# Patient Record
Sex: FEMALE | Race: BLACK OR AFRICAN AMERICAN | NOT HISPANIC OR LATINO | Employment: UNEMPLOYED | URBAN - METROPOLITAN AREA
[De-identification: names, ages, dates, MRNs, and addresses within clinical notes are randomized per-mention and may not be internally consistent; named-entity substitution may affect disease eponyms.]

---

## 2023-08-31 ENCOUNTER — OFFICE VISIT (OUTPATIENT)
Dept: OBSTETRICS AND GYNECOLOGY | Facility: CLINIC | Age: 33
End: 2023-08-31
Payer: COMMERCIAL

## 2023-08-31 ENCOUNTER — TELEPHONE (OUTPATIENT)
Dept: OBSTETRICS AND GYNECOLOGY | Facility: CLINIC | Age: 33
End: 2023-08-31

## 2023-08-31 VITALS
DIASTOLIC BLOOD PRESSURE: 68 MMHG | BODY MASS INDEX: 32.47 KG/M2 | SYSTOLIC BLOOD PRESSURE: 115 MMHG | HEIGHT: 61 IN | WEIGHT: 172 LBS

## 2023-08-31 DIAGNOSIS — Z32.01 POSITIVE PREGNANCY TEST: Primary | ICD-10-CM

## 2023-08-31 LAB
B-HCG UR QL: POSITIVE
CTP QC/QA: YES

## 2023-08-31 PROCEDURE — 3008F PR BODY MASS INDEX (BMI) DOCUMENTED: ICD-10-PCS | Mod: CPTII,S$GLB,, | Performed by: STUDENT IN AN ORGANIZED HEALTH CARE EDUCATION/TRAINING PROGRAM

## 2023-08-31 PROCEDURE — 3074F SYST BP LT 130 MM HG: CPT | Mod: CPTII,S$GLB,, | Performed by: STUDENT IN AN ORGANIZED HEALTH CARE EDUCATION/TRAINING PROGRAM

## 2023-08-31 PROCEDURE — 99213 OFFICE O/P EST LOW 20 MIN: CPT | Mod: S$GLB,,, | Performed by: STUDENT IN AN ORGANIZED HEALTH CARE EDUCATION/TRAINING PROGRAM

## 2023-08-31 PROCEDURE — 99213 PR OFFICE/OUTPT VISIT, EST, LEVL III, 20-29 MIN: ICD-10-PCS | Mod: S$GLB,,, | Performed by: STUDENT IN AN ORGANIZED HEALTH CARE EDUCATION/TRAINING PROGRAM

## 2023-08-31 PROCEDURE — 3074F PR MOST RECENT SYSTOLIC BLOOD PRESSURE < 130 MM HG: ICD-10-PCS | Mod: CPTII,S$GLB,, | Performed by: STUDENT IN AN ORGANIZED HEALTH CARE EDUCATION/TRAINING PROGRAM

## 2023-08-31 PROCEDURE — 81025 POCT URINE PREGNANCY: ICD-10-PCS | Mod: S$GLB,,, | Performed by: STUDENT IN AN ORGANIZED HEALTH CARE EDUCATION/TRAINING PROGRAM

## 2023-08-31 PROCEDURE — 81025 URINE PREGNANCY TEST: CPT | Mod: S$GLB,,, | Performed by: STUDENT IN AN ORGANIZED HEALTH CARE EDUCATION/TRAINING PROGRAM

## 2023-08-31 PROCEDURE — 3078F PR MOST RECENT DIASTOLIC BLOOD PRESSURE < 80 MM HG: ICD-10-PCS | Mod: CPTII,S$GLB,, | Performed by: STUDENT IN AN ORGANIZED HEALTH CARE EDUCATION/TRAINING PROGRAM

## 2023-08-31 PROCEDURE — 3078F DIAST BP <80 MM HG: CPT | Mod: CPTII,S$GLB,, | Performed by: STUDENT IN AN ORGANIZED HEALTH CARE EDUCATION/TRAINING PROGRAM

## 2023-08-31 PROCEDURE — 3008F BODY MASS INDEX DOCD: CPT | Mod: CPTII,S$GLB,, | Performed by: STUDENT IN AN ORGANIZED HEALTH CARE EDUCATION/TRAINING PROGRAM

## 2023-08-31 PROCEDURE — 1159F MED LIST DOCD IN RCRD: CPT | Mod: CPTII,S$GLB,, | Performed by: STUDENT IN AN ORGANIZED HEALTH CARE EDUCATION/TRAINING PROGRAM

## 2023-08-31 PROCEDURE — 1159F PR MEDICATION LIST DOCUMENTED IN MEDICAL RECORD: ICD-10-PCS | Mod: CPTII,S$GLB,, | Performed by: STUDENT IN AN ORGANIZED HEALTH CARE EDUCATION/TRAINING PROGRAM

## 2023-08-31 NOTE — PROGRESS NOTES
"  CC: Absence of menses    Rachel Garza is a 32 y.o. female  presents with complaint of absence of menstruation. She denies nausea/vomIting/abdominal pain/bleeding. UPT is positive. Patient's last menstrual period was 06/10/2023 (exact date). known.  Periods are regular periods every month.    History reviewed. No pertinent past medical history.  Past Surgical History:   Procedure Laterality Date     SECTION, LOW TRANSVERSE N/A     GASTRIC BYPASS       Social History     Socioeconomic History    Marital status: Single   Tobacco Use    Smoking status: Never     Passive exposure: Never    Smokeless tobacco: Never   Substance and Sexual Activity    Alcohol use: Not Currently    Drug use: Never    Sexual activity: Yes     Partners: Male     Birth control/protection: None     Family History   Family history unknown: Yes     OB History    Para Term  AB Living   2 1 1     1   SAB IAB Ectopic Multiple Live Births           1      # Outcome Date GA Lbr Shalom/2nd Weight Sex Delivery Anes PTL Lv   2 Current            1 Term 06/15/17 38w0d  2.551 kg (5 lb 10 oz) F INDUCTION EPI  BRIDGETTE       /68 (BP Location: Left arm, Patient Position: Sitting)   Ht 5' 1" (1.549 m)   Wt 78 kg (172 lb)   LMP 06/10/2023 (Exact Date)   BMI 32.50 kg/m²     ROS:  GENERAL: Denies weight gain or weight loss. Feeling well overall.   SKIN: Denies rash or lesions.   HEAD: Denies head injury or headache.   CHEST: Denies chest pain or shortness of breath.   CARDIOVASCULAR: Denies palpitations or left sided chest pain.   ABDOMEN: No abdominal pain, constipation, diarrhea, nausea, vomiting or rectal bleeding.   URINARY: No frequency, dysuria, hematuria, or burning on urination.  REPRODUCTIVE: See HPI.   BREASTS: The patient performs breast self-examination and denies pain, lumps, or nipple discharge.   HEMATOLOGIC: No easy bruisability or excessive bleeding.   MUSCULOSKELETAL: Denies joint pain or swelling. "   NEUROLOGIC: Denies syncope or weakness.   PSYCHIATRIC: Denies depression, anxiety or mood swings.    PE:   APPEARANCE: Well nourished, well developed, in no acute distress.  AFFECT: WNL, alert and oriented x 3.  SKIN: No acne or hirsutism.  LUNGS: Good respiratory effort. No conversational dyspnea or accessory muscle use  HEART: Regular rate  ABDOMEN: Soft. No tenderness or masses. Normal bowel sounds  EXTREMITIES: No edema.      ASSESSMENT and PLAN:    ICD-10-CM ICD-9-CM    1. Positive pregnancy test  Z32.01 V72.42 POCT URINE PREGNANCY      US OB/GYN Procedure ([facility])          Patient was counseled today on proper weight gain based on the [place] [employer]'s recommendations based on her pre-pregnancy weight. Discussed foods to avoid in pregnancy (i.e. sushi, fish that are high in mercury, deli meat, and unpasteurized cheeses). Discussed prenatal vitamin options (i.e. stool softener, DHA).     Follow up in about 1 week (around 9/7/2023) for Initial prenatal visit.

## 2023-08-31 NOTE — TELEPHONE ENCOUNTER
Pt successfully scheduled for US follow up and VU. Pt requests contact for FMLA and work excuse for today to be sent through the portal.       ----- Message from Olivia Jimenez sent at 8/31/2023  2:44 PM CDT -----  Regarding: OB Appt  Contact: pt [phone]  Type: Needs Medical Advice  Who Called:  Pt     Best Call Back Number: [phone]    Additional Information: Pt calling to schedule 1 week f/u after her US tomorrow. Pt needs am appt because she will be doing labs. No appts avail for me to schedule. Pls call back and advise

## 2023-08-31 NOTE — LETTER
August 31, 2023      [facility] - Obstetrics And Gynecology  [address] [place] [address]  Phone: [phone]  Fax: [phone]       Patient: Rachel Garza   YOB: 1990  Date of Visit: 08/31/2023    To Whom It May Concern:    Zuly Garza  was at [facility] on 08/31/2023. The patient may return to work/school on 9/1/2023 with no restrictions. If you have any questions or concerns, or if I can be of further assistance, please do not hesitate to contact me.    Sincerely,    Ralph Godwin LPN

## 2023-09-01 ENCOUNTER — LAB VISIT (OUTPATIENT)
Dept: LAB | Facility: CLINIC | Age: 33
End: 2023-09-01
Payer: MEDICAID

## 2023-09-01 ENCOUNTER — ROUTINE PRENATAL (OUTPATIENT)
Dept: OBSTETRICS AND GYNECOLOGY | Facility: CLINIC | Age: 33
End: 2023-09-01
Payer: MEDICAID

## 2023-09-01 ENCOUNTER — PROCEDURE VISIT (OUTPATIENT)
Dept: MATERNAL FETAL MEDICINE | Facility: CLINIC | Age: 33
End: 2023-09-01
Payer: COMMERCIAL

## 2023-09-01 VITALS — DIASTOLIC BLOOD PRESSURE: 83 MMHG | WEIGHT: 171 LBS | SYSTOLIC BLOOD PRESSURE: 123 MMHG | BODY MASS INDEX: 32.31 KG/M2

## 2023-09-01 DIAGNOSIS — Z3A.26 26 WEEKS GESTATION OF PREGNANCY: ICD-10-CM

## 2023-09-01 DIAGNOSIS — O09.32 INSUFFICIENT PRENATAL CARE IN SECOND TRIMESTER: Primary | ICD-10-CM

## 2023-09-01 DIAGNOSIS — O99.212 OBESITY AFFECTING PREGNANCY IN SECOND TRIMESTER: ICD-10-CM

## 2023-09-01 DIAGNOSIS — O09.32 INSUFFICIENT PRENATAL CARE IN SECOND TRIMESTER: ICD-10-CM

## 2023-09-01 DIAGNOSIS — Z32.01 POSITIVE PREGNANCY TEST: ICD-10-CM

## 2023-09-01 DIAGNOSIS — O34.219 HISTORY OF CESAREAN DELIVERY, CURRENTLY PREGNANT: ICD-10-CM

## 2023-09-01 LAB
ABO + RH BLD: NORMAL
ALBUMIN SERPL BCP-MCNC: 3 G/DL (ref 3.5–5.2)
ALP SERPL-CCNC: 76 U/L (ref 55–135)
ALT SERPL W/O P-5'-P-CCNC: 14 U/L (ref 10–44)
AMPHET+METHAMPHET UR QL: NEGATIVE
ANION GAP SERPL CALC-SCNC: 10 MMOL/L (ref 8–16)
AST SERPL-CCNC: 23 U/L (ref 10–40)
BARBITURATES UR QL SCN>200 NG/ML: NEGATIVE
BASOPHILS # BLD AUTO: 0.03 K/UL (ref 0–0.2)
BASOPHILS NFR BLD: 0.6 % (ref 0–1.9)
BENZODIAZ UR QL SCN>200 NG/ML: NEGATIVE
BILIRUB SERPL-MCNC: 0.3 MG/DL (ref 0.1–1)
BLD GP AB SCN CELLS X3 SERPL QL: NORMAL
BUN SERPL-MCNC: 6 MG/DL (ref 6–20)
BZE UR QL SCN: NEGATIVE
CALCIUM SERPL-MCNC: 8.7 MG/DL (ref 8.7–10.5)
CANNABINOIDS UR QL SCN: NEGATIVE
CHLORIDE SERPL-SCNC: 107 MMOL/L (ref 95–110)
CO2 SERPL-SCNC: 19 MMOL/L (ref 23–29)
CREAT SERPL-MCNC: 0.8 MG/DL (ref 0.5–1.4)
CREAT UR-MCNC: 88 MG/DL (ref 15–325)
DIFFERENTIAL METHOD: ABNORMAL
EOSINOPHIL # BLD AUTO: 0.1 K/UL (ref 0–0.5)
EOSINOPHIL NFR BLD: 1.9 % (ref 0–8)
ERYTHROCYTE [DISTWIDTH] IN BLOOD BY AUTOMATED COUNT: 11.8 % (ref 11.5–14.5)
EST. GFR  (NO RACE VARIABLE): >60 ML/MIN/1.73 M^2
ETHANOL UR-MCNC: <10 MG/DL
GLUCOSE SERPL-MCNC: 105 MG/DL (ref 70–140)
GLUCOSE SERPL-MCNC: 106 MG/DL (ref 70–110)
HAV IGM SERPL QL IA: NORMAL
HBV CORE IGM SERPL QL IA: NORMAL
HBV SURFACE AG SERPL QL IA: NORMAL
HCT VFR BLD AUTO: 28.4 % (ref 37–48.5)
HCV AB SERPL QL IA: NORMAL
HGB BLD-MCNC: 9.2 G/DL (ref 12–16)
HIV 1+2 AB+HIV1 P24 AG SERPL QL IA: NORMAL
IMM GRANULOCYTES # BLD AUTO: 0.02 K/UL (ref 0–0.04)
IMM GRANULOCYTES NFR BLD AUTO: 0.4 % (ref 0–0.5)
LYMPHOCYTES # BLD AUTO: 1.1 K/UL (ref 1–4.8)
LYMPHOCYTES NFR BLD: 21.7 % (ref 18–48)
MCH RBC QN AUTO: 32.6 PG (ref 27–31)
MCHC RBC AUTO-ENTMCNC: 32.4 G/DL (ref 32–36)
MCV RBC AUTO: 101 FL (ref 82–98)
METHADONE UR QL SCN>300 NG/ML: NEGATIVE
MONOCYTES # BLD AUTO: 0.5 K/UL (ref 0.3–1)
MONOCYTES NFR BLD: 9.7 % (ref 4–15)
NEUTROPHILS # BLD AUTO: 3.4 K/UL (ref 1.8–7.7)
NEUTROPHILS NFR BLD: 65.7 % (ref 38–73)
NRBC BLD-RTO: 0 /100 WBC
OPIATES UR QL SCN: NEGATIVE
PCP UR QL SCN>25 NG/ML: NEGATIVE
PLATELET # BLD AUTO: 325 K/UL (ref 150–450)
PMV BLD AUTO: 9.2 FL (ref 9.2–12.9)
POTASSIUM SERPL-SCNC: 3.2 MMOL/L (ref 3.5–5.1)
PROT SERPL-MCNC: 7.1 G/DL (ref 6–8.4)
RBC # BLD AUTO: 2.82 M/UL (ref 4–5.4)
SODIUM SERPL-SCNC: 136 MMOL/L (ref 136–145)
SPECIMEN OUTDATE: NORMAL
TOXICOLOGY INFORMATION: NORMAL
WBC # BLD AUTO: 5.17 K/UL (ref 3.9–12.7)

## 2023-09-01 PROCEDURE — 76811 US OB/GYN PROCEDURE (VIEWPOINT): ICD-10-PCS | Mod: S$GLB,,, | Performed by: OBSTETRICS & GYNECOLOGY

## 2023-09-01 PROCEDURE — 99213 PR OFFICE/OUTPT VISIT, EST, LEVL III, 20-29 MIN: ICD-10-PCS | Mod: TH,S$GLB,, | Performed by: STUDENT IN AN ORGANIZED HEALTH CARE EDUCATION/TRAINING PROGRAM

## 2023-09-01 PROCEDURE — 86901 BLOOD TYPING SEROLOGIC RH(D): CPT | Performed by: STUDENT IN AN ORGANIZED HEALTH CARE EDUCATION/TRAINING PROGRAM

## 2023-09-01 PROCEDURE — 80053 COMPREHEN METABOLIC PANEL: CPT | Performed by: STUDENT IN AN ORGANIZED HEALTH CARE EDUCATION/TRAINING PROGRAM

## 2023-09-01 PROCEDURE — 36415 PR COLLECTION VENOUS BLOOD,VENIPUNCTURE: ICD-10-PCS | Mod: ,,, | Performed by: STUDENT IN AN ORGANIZED HEALTH CARE EDUCATION/TRAINING PROGRAM

## 2023-09-01 PROCEDURE — 36415 COLL VENOUS BLD VENIPUNCTURE: CPT | Mod: ,,, | Performed by: STUDENT IN AN ORGANIZED HEALTH CARE EDUCATION/TRAINING PROGRAM

## 2023-09-01 PROCEDURE — 82950 GLUCOSE TEST: CPT | Performed by: STUDENT IN AN ORGANIZED HEALTH CARE EDUCATION/TRAINING PROGRAM

## 2023-09-01 PROCEDURE — 87591 N.GONORRHOEAE DNA AMP PROB: CPT | Performed by: STUDENT IN AN ORGANIZED HEALTH CARE EDUCATION/TRAINING PROGRAM

## 2023-09-01 PROCEDURE — 76811 OB US DETAILED SNGL FETUS: CPT | Mod: S$GLB,,, | Performed by: OBSTETRICS & GYNECOLOGY

## 2023-09-01 PROCEDURE — 85025 COMPLETE CBC W/AUTO DIFF WBC: CPT | Performed by: STUDENT IN AN ORGANIZED HEALTH CARE EDUCATION/TRAINING PROGRAM

## 2023-09-01 PROCEDURE — 99213 OFFICE O/P EST LOW 20 MIN: CPT | Mod: TH,S$GLB,, | Performed by: STUDENT IN AN ORGANIZED HEALTH CARE EDUCATION/TRAINING PROGRAM

## 2023-09-01 PROCEDURE — 87086 URINE CULTURE/COLONY COUNT: CPT | Performed by: STUDENT IN AN ORGANIZED HEALTH CARE EDUCATION/TRAINING PROGRAM

## 2023-09-01 PROCEDURE — 80307 DRUG TEST PRSMV CHEM ANLYZR: CPT | Performed by: STUDENT IN AN ORGANIZED HEALTH CARE EDUCATION/TRAINING PROGRAM

## 2023-09-01 NOTE — PROGRESS NOTES
Subjective:      Rachel Garza is being seen today for her first obstetrical visit.  She presents today for absence of menses with a positive home pregnancy test. She is a   She is at 26w1d gestation. Patient's last menstrual period was 06/10/2023 (exact date). She is sure  of her LMP. Past medical hx is negative. Her previous obstetrical history is significant for  none . Pregnancy history fully reviewed.  Patient is initiating prenatal care 26 weeks. She did not realize she was pregnant until 1 week ago when she had a positive home pregnancy test and noticed her belly was getting more distended.  Denies vaginal bleeding, leakage of fluid or contractions.  No nausea or vomiting.      OB hx: G1 was delivery via emergent  section due to fetal bradycardia immediately following epidural.  She was already 6 cm dilated. Op report requested    PMH: denies  PSH:  x1, gastric sleeve  Allergies: NKDA  Meds: PNVs      Review of Systems:  General ROS: negative for headache or visual changes  Breast ROS: negative for breast lumps  Gastrointestinal ROS: negative for constipation, diarrhea or nausea/vomiting  Musculoskeletal ROS: negative for pain in joints or swelling in face or hands.   Neurological ROS: negative for - headaches, numbness/tingling or visual changes     Objective:*      Physical Exam      Constitutional: She is oriented to person, place, and time. She appears well-developed and well-nourished. No distress.     Pulmonary/Chest: Effort normal. No respiratory distress  Abdominal: Soft, gravid, nontender. No rebound and no guarding.   Genitourinary: Deferred   Musculoskeletal: Normal range of motion, Minimal peripheral edema.   Neurological: She is alert and oriented to person, place, and time. Coordination normal.   Skin: Skin is warm and dry. She is not diaphoretic.  Psychiatric: She has a normal mood and affect.     Assessment:      Overall doing well.   32 y.o. at 26w1d  Gestation     There is no problem list on file for this patient.    Current Outpatient Medications on File Prior to Visit   Medication Sig Dispense Refill    prenatal vit/iron fum/folic ac (PRENATAL 1+1 ORAL) Take by mouth.       No current facility-administered medications on file prior to visit.         Plan:        Insufficient prenatal care in second trimester  -     HIV 1/2 Ag/Ab (4th Gen); Future; Expected date: 2023  -     RPR; Future; Expected date: 2023  -     Type & Screen; Future; Expected date: 2023  -     Rubella antibody, IgG; Future; Expected date: 2023  -     Urine culture  -     CBC auto differential; Future; Expected date: 2023  -     C. trachomatis/N. gonorrhoeae by AMP DNA [place]ra[place]; Urine  -     Hepatitis Panel, Acute; Future; Expected date: 2023  -     Comprehensive metabolic panel; Future; Expected date: 2023  -     Toxicology screen, urine  -     OB Glucose Screen; Future; Expected date: 2023  -     Prenatal Miscellaneous Test, Blood; Future; Expected date: 2023  -     US OB/GYN Procedure ([facility]); Future; Expected date: 10/01/2023    26 weeks gestation of pregnancy  -     Connected MOM Enrollment  -     Assign Connected MOM Program Consent Questionnaire    Obesity affecting pregnancy in second trimester    History of  delivery, currently pregnant       - Continue to take Prenatal vitamins.  - Initial prenatal labs obtained. R/b & limitations of prenatal genetic testing discussed, pt desires. Pretty Lundberg testing ordered  - Anatomy scan limited. F/u ultrasound ordered  - Patient was counseled today on proper weight gain based on the [place] [employer]'s recommendations based on her pre-pregnancy weight. Discussed foods to avoid in pregnancy (i.e. sushi, fish that are high in mercury, deli meat, and unpasteurized cheeses).  - Desires TOLAC. OP report requested.  - Follow up 2Weeks, bleeding/pain precautions, kick counts,   labor and pre-eclampsia precautions discussed. .

## 2023-09-02 LAB
C TRACH DNA SPEC QL NAA+PROBE: NOT DETECTED
N GONORRHOEA DNA SPEC QL NAA+PROBE: NOT DETECTED
RPR SER QL: NORMAL

## 2023-09-03 LAB — BACTERIA UR CULT: NORMAL

## 2023-09-05 ENCOUNTER — PATIENT MESSAGE (OUTPATIENT)
Dept: ADMINISTRATIVE | Facility: OTHER | Age: 33
End: 2023-09-05
Payer: COMMERCIAL

## 2023-09-05 LAB
RUBV IGG SER-ACNC: 30.2 IU/ML
RUBV IGG SER-IMP: REACTIVE

## 2023-09-08 ENCOUNTER — TELEPHONE (OUTPATIENT)
Dept: OBSTETRICS AND GYNECOLOGY | Facility: CLINIC | Age: 33
End: 2023-09-08
Payer: COMMERCIAL

## 2023-09-08 NOTE — TELEPHONE ENCOUNTER
----- Message from Malka Mcintosh sent at 9/8/2023 12:01 PM CDT -----  Regarding: advise  Contact: Patient  Type: Needs Medical Advice  Who Called:  Patient  Symptoms (please be specific):  proof of pregnancy  How long has patient had these symptoms:    Pharmacy name and phone #:    Best Call Back Number: [phone]    Additional Information: Pt needs the following to give to [facility]; please call to advise thanks!  Fax number: [phone] [facility]

## 2023-09-11 RX ORDER — DOCUSATE SODIUM 100 MG/1
100 CAPSULE, LIQUID FILLED ORAL 2 TIMES DAILY PRN
Qty: 60 CAPSULE | Refills: 1 | Status: SHIPPED | OUTPATIENT
Start: 2023-09-11 | End: 2024-09-10

## 2023-09-11 RX ORDER — FERROUS SULFATE 325(65) MG
325 TABLET, DELAYED RELEASE (ENTERIC COATED) ORAL DAILY
Qty: 30 TABLET | Refills: 6 | Status: SHIPPED | OUTPATIENT
Start: 2023-09-11

## 2023-09-14 ENCOUNTER — PATIENT MESSAGE (OUTPATIENT)
Dept: OTHER | Facility: OTHER | Age: 33
End: 2023-09-14
Payer: COMMERCIAL

## 2023-09-25 ENCOUNTER — TELEPHONE (OUTPATIENT)
Dept: OBSTETRICS AND GYNECOLOGY | Facility: CLINIC | Age: 33
End: 2023-09-25
Payer: COMMERCIAL

## 2023-09-25 NOTE — TELEPHONE ENCOUNTER
Pt wanted to confirm receipt of [facility] paperwork and appt date/times. Pt states she drove and dropped off her paperwork to [facility]. Pt requests office visit to be same day as her US. Pt transferred to  to see if this can be done.       ----- Message from Iliana White sent at 9/20/2023  3:24 PM CDT -----  Contact: pt  Type: Needs Medical Advice         Who Called: pt    Best Call Back Number:[phone]  Additional Information: Requesting a call back regarding  pt is asking for you to call her please.   Please Advise- Thank you

## 2023-09-28 ENCOUNTER — PATIENT MESSAGE (OUTPATIENT)
Dept: OTHER | Facility: OTHER | Age: 33
End: 2023-09-28
Payer: COMMERCIAL

## 2023-09-29 ENCOUNTER — PROCEDURE VISIT (OUTPATIENT)
Dept: MATERNAL FETAL MEDICINE | Facility: CLINIC | Age: 33
End: 2023-09-29
Payer: COMMERCIAL

## 2023-09-29 DIAGNOSIS — O09.32 INSUFFICIENT PRENATAL CARE IN SECOND TRIMESTER: ICD-10-CM

## 2023-09-29 PROCEDURE — 76816 US OB/GYN PROCEDURE (VIEWPOINT): ICD-10-PCS | Mod: S$GLB,,, | Performed by: OBSTETRICS & GYNECOLOGY

## 2023-09-29 PROCEDURE — 76816 OB US FOLLOW-UP PER FETUS: CPT | Mod: S$GLB,,, | Performed by: OBSTETRICS & GYNECOLOGY

## 2023-10-04 ENCOUNTER — ROUTINE PRENATAL (OUTPATIENT)
Dept: OBSTETRICS AND GYNECOLOGY | Facility: CLINIC | Age: 33
End: 2023-10-04
Payer: COMMERCIAL

## 2023-10-04 VITALS
WEIGHT: 176.38 LBS | SYSTOLIC BLOOD PRESSURE: 111 MMHG | BODY MASS INDEX: 33.33 KG/M2 | HEART RATE: 103 BPM | DIASTOLIC BLOOD PRESSURE: 71 MMHG

## 2023-10-04 DIAGNOSIS — O34.219 DESIRES VBAC (VAGINAL BIRTH AFTER CESAREAN) TRIAL: ICD-10-CM

## 2023-10-04 DIAGNOSIS — O34.219 HISTORY OF CESAREAN DELIVERY, CURRENTLY PREGNANT: ICD-10-CM

## 2023-10-04 DIAGNOSIS — O09.33 INSUFFICIENT PRENATAL CARE IN THIRD TRIMESTER: ICD-10-CM

## 2023-10-04 DIAGNOSIS — O99.013 ANEMIA DURING PREGNANCY IN THIRD TRIMESTER: ICD-10-CM

## 2023-10-04 DIAGNOSIS — O99.213 OBESITY AFFECTING PREGNANCY IN THIRD TRIMESTER, UNSPECIFIED OBESITY TYPE: ICD-10-CM

## 2023-10-04 DIAGNOSIS — Z3A.30 30 WEEKS GESTATION OF PREGNANCY: Primary | ICD-10-CM

## 2023-10-04 PROCEDURE — 0502F PR SUBSEQUENT PRENATAL CARE: ICD-10-PCS | Mod: CPTII,S$GLB,, | Performed by: STUDENT IN AN ORGANIZED HEALTH CARE EDUCATION/TRAINING PROGRAM

## 2023-10-04 PROCEDURE — 0502F SUBSEQUENT PRENATAL CARE: CPT | Mod: CPTII,S$GLB,, | Performed by: STUDENT IN AN ORGANIZED HEALTH CARE EDUCATION/TRAINING PROGRAM

## 2023-10-04 RX ORDER — FERROUS SULFATE 324(65)MG
324 TABLET, DELAYED RELEASE (ENTERIC COATED) ORAL DAILY
Qty: 30 TABLET | Refills: 5 | Status: SHIPPED | OUTPATIENT
Start: 2023-10-04

## 2023-10-04 NOTE — PROGRESS NOTES
10/4/2023  Chief Complaint   Patient presents with    Routine Prenatal Visit     32 y.o.  at 30w6d  Estimated Date of Delivery: 2023, by Ultrasound, dating reviewed.      Patient reports: Good fetal movements reported. No Bleeding, leakage of fluid or contractions. She is doing well.  She is taking prenatal vitamins. Ms. Garza is adjusting well and has a good support system of family and friends. She is coping with pregnancy and having no difficulty with sleep.    OB risk factors: late to care, hx of  x1, requesting TOLAC    OB hx: G1 was delivery via emergent  section due to fetal bradycardia immediately following epidural.  She was already 6 cm dilated. Op report requested     PMH: denies  PSH:  x1, gastric sleeve  Allergies: NKDA  Meds: PNVs    Prenatal labs reviewed and updated today  - Rh positive  - Rubella immune  - STD neg  - GCT wnl  - NIPT low risk  - H/H 9.2/28.4,     OB History    Para Term  AB Living   2 1 1     1   SAB IAB Ectopic Multiple Live Births           1      # Outcome Date GA Lbr Shalom/2nd Weight Sex Delivery Anes PTL Lv   2 Current            1 Term 06/15/17 38w0d  2.551 kg (5 lb 10 oz) F CS-Unspec EPI  BRIDGETTE       Review of Systems:  General ROS: negative for headache or visual changes  Breast ROS: negative for breast lumps  Gastrointestinal ROS: negative for constipation, diarrhea or nausea/vomiting  Musculoskeletal ROS: negative for pain in joints or swelling in face or hands.   Neurological ROS: negative for - headaches, numbness/tingling or visual changes      Physical Exam:  /71   Pulse 103   Wt 80 kg (176 lb 6.4 oz)   LMP 06/10/2023 (Exact Date)   BMI 33.33 kg/m²     Constitutional: She is oriented to person, place, and time. She appears well-developed and well-nourished. No distress.     Pulmonary/Chest: Effort normal. No respiratory distress  Abdominal: Soft, gravid, nontender. No rebound and no guarding.    Genitourinary: Deferred   Musculoskeletal: Normal range of motion, Minimal peripheral edema.   Neurological: She is alert and oriented to person, place, and time. Coordination normal.   Skin: Skin is warm and dry. She is not diaphoretic.  Psychiatric: She has a normal mood and affect.      Assessment:  Overall doing well.   32 y.o.at 30w6d by 26w1d sono    There is no problem list on file for this patient.    Current Outpatient Medications on File Prior to Visit   Medication Sig Dispense Refill    ferrous sulfate 325 (65 FE) MG EC tablet Take 1 tablet (325 mg total) by mouth once daily. 30 tablet 6    prenatal vit/iron fum/folic ac (PRENATAL 1+1 ORAL) Take by mouth.      docusate sodium (COLACE) 100 MG capsule Take 1 capsule (100 mg total) by mouth 2 (two) times daily as needed for Constipation. (Patient not taking: Reported on 10/4/2023) 60 capsule 1     No current facility-administered medications on file prior to visit.     30 weeks gestation of pregnancy  -     BREAST PUMP FOR HOME USE    Obesity affecting pregnancy in second trimester, unspecified obesity type    Insufficient prenatal care in second trimester    History of  delivery, currently pregnant    Anemia during pregnancy in third trimester    Desires  (vaginal birth after ) trial    Other orders  -     ferrous sulfate 324 mg (65 mg iron) TbEC; Take 1 tablet (324 mg total) by mouth once daily.  Dispense: 30 tablet; Refill: 5       Plan:  Overall doing well  - F/u anatomy scan wnl. EFW 30%ile  - Continue to take Prenatal vitamins. Continue po iron supplementation  - Desires to defer Tdap till postpartum  - Pt strongly desires TOLAC. We have discussed the risk and benefits of TOLAC versus scheduled repeat  section in detail. She understands the risk of TOLAC including failure requiring  delivery, emergent  delivery and fetal compromise due to uterine rupture, hemorrhage, prolonged hospitalization and death due  to complications. Her predicted chance of successful  is 53.4 % by NICHD criteria. Her chances are better if she goes into labor on her own. She understands that if I am unable to be present when she goes into labor, it will be at the discretion of the admitting physician to determine if she could proceed with TOLAC, and they could possibly decline and recommend repeat  delivery. Patient has been well counseled, understands the risk, as such I will be okay proceeding with TOLAC pending her OP report. Awaiting OP report to confirm low transverse uterine incision.  - Follow up 2Weeks, bleeding/pain precautions, kick counts,  labor and pre-eclampsia precautions discussed. .

## 2023-10-04 NOTE — LETTER
October 4, 2023      [facility] - Obstetrics And Gynecology  [address] [place] [address]  Phone: [phone]  Fax: [phone]       Patient: Rachel Garza   YOB: 1990  Date of Visit: 10/04/2023    To Whom It May Concern:    Zuly Garza  was at [facility] on 10/04/2023. The patient may return to work/school on 10/5/2023 with no restrictions. If you have any questions or concerns, or if I can be of further assistance, please do not hesitate to contact me.    Sincerely,    Sola Gonsalves MA

## 2023-10-12 ENCOUNTER — PATIENT MESSAGE (OUTPATIENT)
Dept: OTHER | Facility: OTHER | Age: 33
End: 2023-10-12
Payer: COMMERCIAL

## 2023-10-12 ENCOUNTER — PATIENT MESSAGE (OUTPATIENT)
Dept: OBSTETRICS AND GYNECOLOGY | Facility: CLINIC | Age: 33
End: 2023-10-12
Payer: COMMERCIAL

## 2023-10-16 ENCOUNTER — ROUTINE PRENATAL (OUTPATIENT)
Dept: OBSTETRICS AND GYNECOLOGY | Facility: CLINIC | Age: 33
End: 2023-10-16
Payer: COMMERCIAL

## 2023-10-16 VITALS
DIASTOLIC BLOOD PRESSURE: 77 MMHG | WEIGHT: 178.63 LBS | SYSTOLIC BLOOD PRESSURE: 127 MMHG | HEART RATE: 69 BPM | BODY MASS INDEX: 33.75 KG/M2

## 2023-10-16 DIAGNOSIS — O34.219 HISTORY OF CESAREAN DELIVERY, CURRENTLY PREGNANT: ICD-10-CM

## 2023-10-16 DIAGNOSIS — Z3A.32 32 WEEKS GESTATION OF PREGNANCY: Primary | ICD-10-CM

## 2023-10-16 DIAGNOSIS — O34.219 DESIRES VBAC (VAGINAL BIRTH AFTER CESAREAN) TRIAL: ICD-10-CM

## 2023-10-16 DIAGNOSIS — O09.33 INSUFFICIENT PRENATAL CARE IN THIRD TRIMESTER: ICD-10-CM

## 2023-10-16 DIAGNOSIS — O99.013 ANEMIA DURING PREGNANCY IN THIRD TRIMESTER: ICD-10-CM

## 2023-10-16 DIAGNOSIS — O99.213 OBESITY AFFECTING PREGNANCY IN THIRD TRIMESTER, UNSPECIFIED OBESITY TYPE: ICD-10-CM

## 2023-10-16 PROCEDURE — 0502F PR SUBSEQUENT PRENATAL CARE: ICD-10-PCS | Mod: CPTII,S$GLB,, | Performed by: STUDENT IN AN ORGANIZED HEALTH CARE EDUCATION/TRAINING PROGRAM

## 2023-10-16 PROCEDURE — 0502F SUBSEQUENT PRENATAL CARE: CPT | Mod: CPTII,S$GLB,, | Performed by: STUDENT IN AN ORGANIZED HEALTH CARE EDUCATION/TRAINING PROGRAM

## 2023-10-16 NOTE — PROGRESS NOTES
10/16/2023  Chief Complaint   Patient presents with    Routine Prenatal Visit     32 y.o.  at 32w4d  Estimated Date of Delivery: 2023, by Ultrasound, dating reviewed.      Patient reports: Good fetal movements reported. No Bleeding, leakage of fluid or contractions. She is doing well.  She is taking prenatal vitamins. Ms. Garza is adjusting well and has a good support system of family and friends. She is coping with pregnancy and having no difficulty with sleep.    OB risk factors: late to care, hx of  x1, requesting TOLAC    OB hx: G1 was delivery via emergent  section due to fetal bradycardia immediately following epidural. She was already 7 cm dilated.      PMH: denies  PSH:  x1, gastric sleeve  Allergies: NKDA  Meds: PNVs    Prenatal labs reviewed and updated today  - Rh positive  - Rubella immune  - STD neg  - GCT wnl  - NIPT low risk  - H/H 9.2/28.4,     OB History    Para Term  AB Living   2 1 1     1   SAB IAB Ectopic Multiple Live Births           1      # Outcome Date GA Lbr Shalom/2nd Weight Sex Delivery Anes PTL Lv   2 Current            1 Term 06/15/17 38w0d  2.607 kg (5 lb 12 oz) F CS-Unspec EPI  BRIDGETTE      Complications: IUGR (intrauterine growth restriction) affecting care of mother       Review of Systems:  General ROS: negative for headache or visual changes  Breast ROS: negative for breast lumps  Gastrointestinal ROS: negative for constipation, diarrhea or nausea/vomiting  Musculoskeletal ROS: negative for pain in joints or swelling in face or hands.   Neurological ROS: negative for - headaches, numbness/tingling or visual changes      Physical Exam:  /77   Pulse 69   Wt 81 kg (178 lb 9.6 oz)   LMP 06/10/2023 (Exact Date)   BMI 33.75 kg/m²     Constitutional: She is oriented to person, place, and time. She appears well-developed and well-nourished. No distress.     Pulmonary/Chest: Effort normal. No respiratory  distress  Abdominal: Soft, gravid, nontender. No rebound and no guarding.   Genitourinary: Deferred   Musculoskeletal: Normal range of motion, Minimal peripheral edema.   Neurological: She is alert and oriented to person, place, and time. Coordination normal.   Skin: Skin is warm and dry. She is not diaphoretic.  Psychiatric: She has a normal mood and affect.      Assessment:  Overall doing well.   32 y.o.at 32w4d by 26w1d sono    There is no problem list on file for this patient.    Current Outpatient Medications on File Prior to Visit   Medication Sig Dispense Refill    ferrous sulfate 324 mg (65 mg iron) TbEC Take 1 tablet (324 mg total) by mouth once daily. 30 tablet 5    [DISCONTINUED] prenatal vit/iron fum/folic ac (PRENATAL 1+1 ORAL) Take by mouth.      docusate sodium (COLACE) 100 MG capsule Take 1 capsule (100 mg total) by mouth 2 (two) times daily as needed for Constipation. (Patient not taking: Reported on 10/4/2023) 60 capsule 1    ferrous sulfate 325 (65 FE) MG EC tablet Take 1 tablet (325 mg total) by mouth once daily. (Patient not taking: Reported on 10/16/2023) 30 tablet 6     No current facility-administered medications on file prior to visit.     32 weeks gestation of pregnancy    Obesity affecting pregnancy in third trimester, unspecified obesity type    Insufficient prenatal care in third trimester    History of  delivery, currently pregnant    Desires  (vaginal birth after ) trial    Anemia during pregnancy in third trimester    Other orders  -     PNV,calcium 72-iron-folic acid (PRENATAL VITAMIN PLUS LOW IRON) 27 mg iron- 1 mg Tab; Take 1 tablet (1 each total) by mouth once daily.  Dispense: 30 tablet; Refill: 4       Plan:  Overall doing well  - 3T labs next visit  - F/u anatomy scan wnl. EFW 30%ile  - Continue to take Prenatal vitamins. Continue po iron supplementation  - Desires to defer Tdap till postpartum  - Pt strongly desires TOLAC. We have discussed the risk and  benefits of TOLAC versus scheduled repeat  section in detail. She understands the risk of TOLAC including failure requiring  delivery, emergent  delivery and fetal compromise due to uterine rupture, hemorrhage, prolonged hospitalization and death due to complications. Her predicted chance of successful  is 53.4 % by NICHD criteria. Her chances are better if she goes into labor on her own. She understands that if I am unable to be present when she goes into labor, it will be at the discretion of the admitting physician to determine if she could proceed with TOLAC, and they could possibly decline and recommend repeat  delivery. Patient has been well counseled, understands the risk, as such I will be okay proceeding with TOLAC. OP report to confirms low transverse uterine incision.  - Follow up 2Weeks, bleeding/pain precautions, kick counts,  labor and pre-eclampsia precautions discussed. .

## 2023-10-30 ENCOUNTER — PATIENT MESSAGE (OUTPATIENT)
Dept: OBSTETRICS AND GYNECOLOGY | Facility: CLINIC | Age: 33
End: 2023-10-30
Payer: COMMERCIAL

## 2023-11-02 ENCOUNTER — PATIENT MESSAGE (OUTPATIENT)
Dept: OTHER | Facility: OTHER | Age: 33
End: 2023-11-02
Payer: COMMERCIAL

## 2023-11-03 ENCOUNTER — ROUTINE PRENATAL (OUTPATIENT)
Dept: OBSTETRICS AND GYNECOLOGY | Facility: CLINIC | Age: 33
End: 2023-11-03
Payer: COMMERCIAL

## 2023-11-03 VITALS
SYSTOLIC BLOOD PRESSURE: 131 MMHG | BODY MASS INDEX: 34.2 KG/M2 | WEIGHT: 181 LBS | DIASTOLIC BLOOD PRESSURE: 88 MMHG | HEART RATE: 80 BPM

## 2023-11-03 DIAGNOSIS — Z3A.35 35 WEEKS GESTATION OF PREGNANCY: Primary | ICD-10-CM

## 2023-11-03 DIAGNOSIS — O09.33 INSUFFICIENT PRENATAL CARE IN THIRD TRIMESTER: ICD-10-CM

## 2023-11-03 DIAGNOSIS — O34.219 DESIRES VBAC (VAGINAL BIRTH AFTER CESAREAN) TRIAL: ICD-10-CM

## 2023-11-03 PROCEDURE — 0502F SUBSEQUENT PRENATAL CARE: CPT | Mod: CPTII,S$GLB,, | Performed by: STUDENT IN AN ORGANIZED HEALTH CARE EDUCATION/TRAINING PROGRAM

## 2023-11-03 PROCEDURE — 0502F PR SUBSEQUENT PRENATAL CARE: ICD-10-PCS | Mod: CPTII,S$GLB,, | Performed by: STUDENT IN AN ORGANIZED HEALTH CARE EDUCATION/TRAINING PROGRAM

## 2023-11-03 NOTE — PROGRESS NOTES
11/3/2023  Chief Complaint   Patient presents with    Routine Prenatal Visit     32 y.o.  at 35w1d  Estimated Date of Delivery: 2023, by Ultrasound, dating reviewed.      Patient reports: Good fetal movements reported. No Bleeding, leakage of fluid or contractions. She is doing well.  She is taking prenatal vitamins. Ms. Garza is adjusting well and has a good support system of family and friends. She is coping with pregnancy.  Reports difficulty with sleep due to her current work schedule.    OB risk factors: late to care, hx of  x1, requesting TOLAC    OB hx: G1 was delivery via emergent  section due to fetal bradycardia immediately following epidural. She was already 7 cm dilated.      PMH: denies  PSH:  x1, gastric sleeve  Allergies: NKDA  Meds: PNVs    Prenatal labs reviewed and updated today  - Rh positive  - Rubella immune  - STD neg  - GCT wnl  - NIPT low risk  - H/H 9.2/28.4,     OB History    Para Term  AB Living   2 1 1     1   SAB IAB Ectopic Multiple Live Births           1      # Outcome Date GA Lbr Shalom/2nd Weight Sex Delivery Anes PTL Lv   2 Current            1 Term 06/15/17 38w0d  2.607 kg (5 lb 12 oz) F CS-Unspec EPI  BRIDGETTE      Complications: IUGR (intrauterine growth restriction) affecting care of mother       Review of Systems:  General ROS: negative for headache or visual changes  Breast ROS: negative for breast lumps  Gastrointestinal ROS: negative for constipation, diarrhea or nausea/vomiting  Musculoskeletal ROS: negative for pain in joints or swelling in face or hands.   Neurological ROS: negative for - headaches, numbness/tingling or visual changes      Physical Exam:  /88   Pulse 80   Wt 82.1 kg (181 lb)   LMP 06/10/2023 (Exact Date)   BMI 34.20 kg/m²     Constitutional: She is oriented to person, place, and time. She appears well-developed and well-nourished. No distress.     Pulmonary/Chest: Effort normal. No  respiratory distress  Abdominal: Soft, gravid, nontender. No rebound and no guarding.   Genitourinary: Deferred   Musculoskeletal: Normal range of motion, Minimal peripheral edema.   Neurological: She is alert and oriented to person, place, and time. Coordination normal.   Skin: Skin is warm and dry. She is not diaphoretic.  Psychiatric: She has a normal mood and affect.      Assessment:  Overall doing well.   32 y.o.at 35w1d by 26w1d sono    There is no problem list on file for this patient.    Current Outpatient Medications on File Prior to Visit   Medication Sig Dispense Refill    ferrous sulfate 324 mg (65 mg iron) TbEC Take 1 tablet (324 mg total) by mouth once daily. 30 tablet 5    PNV,calcium 72-iron-folic acid (PRENATAL VITAMIN PLUS LOW IRON) 27 mg iron- 1 mg Tab Take 1 tablet (1 each total) by mouth once daily. 30 tablet 4    docusate sodium (COLACE) 100 MG capsule Take 1 capsule (100 mg total) by mouth 2 (two) times daily as needed for Constipation. (Patient not taking: Reported on 10/4/2023) 60 capsule 1    ferrous sulfate 325 (65 FE) MG EC tablet Take 1 tablet (325 mg total) by mouth once daily. (Patient not taking: Reported on 10/16/2023) 30 tablet 6     No current facility-administered medications on file prior to visit.     35 weeks gestation of pregnancy  -     CBC Auto Differential; Future; Expected date: 2023  -     HIV 1/2 Ag/Ab (4th Gen); Future; Expected date: 2023  -     RPR; Future; Expected date: 2023    Insufficient prenatal care in third trimester    Desires  (vaginal birth after ) trial      Plan:  Overall doing well  - 3T labs today. GBS next visit  - Will Assist with recommended paperwork for employer to adjust work hours to improve her sleep during the duration of pregnancy  - Continue to take Prenatal vitamins. Continue po iron supplementation  - Desires to defer Tdap till postpartum  - Pt strongly desires TOLAC. We have discussed the risk and benefits of  TOLAC versus scheduled repeat  section in detail. She understands the risk of TOLAC including failure requiring  delivery, emergent  delivery and fetal compromise due to uterine rupture, hemorrhage, prolonged hospitalization and death due to complications. Her predicted chance of successful  is 53.4 % by NICHD criteria. Her chances are better if she goes into labor on her own. She understands that if I am unable to be present when she goes into labor, it will be at the discretion of the admitting physician to determine if she could proceed with TOLAC, and they could possibly decline and recommend repeat  delivery. Patient has been well counseled, understands the risk, as such I will be okay proceeding with TOLAC. OP report to confirms low transverse uterine incision.  - Follow up 1 week, bleeding/pain precautions, kick counts,  labor and pre-eclampsia precautions discussed. .

## 2023-11-08 PROCEDURE — 86592 SYPHILIS TEST NON-TREP QUAL: CPT | Performed by: STUDENT IN AN ORGANIZED HEALTH CARE EDUCATION/TRAINING PROGRAM

## 2023-11-08 PROCEDURE — 87389 HIV-1 AG W/HIV-1&-2 AB AG IA: CPT | Performed by: STUDENT IN AN ORGANIZED HEALTH CARE EDUCATION/TRAINING PROGRAM

## 2023-11-09 ENCOUNTER — ROUTINE PRENATAL (OUTPATIENT)
Dept: OBSTETRICS AND GYNECOLOGY | Facility: CLINIC | Age: 33
End: 2023-11-09
Payer: COMMERCIAL

## 2023-11-09 ENCOUNTER — LAB VISIT (OUTPATIENT)
Dept: LAB | Facility: CLINIC | Age: 33
End: 2023-11-09
Payer: COMMERCIAL

## 2023-11-09 VITALS — WEIGHT: 186 LBS | BODY MASS INDEX: 35.14 KG/M2 | DIASTOLIC BLOOD PRESSURE: 63 MMHG | SYSTOLIC BLOOD PRESSURE: 115 MMHG

## 2023-11-09 DIAGNOSIS — Z3A.35 35 WEEKS GESTATION OF PREGNANCY: ICD-10-CM

## 2023-11-09 DIAGNOSIS — O34.219 DESIRES VBAC (VAGINAL BIRTH AFTER CESAREAN) TRIAL: ICD-10-CM

## 2023-11-09 DIAGNOSIS — O99.213 OBESITY AFFECTING PREGNANCY IN THIRD TRIMESTER, UNSPECIFIED OBESITY TYPE: ICD-10-CM

## 2023-11-09 DIAGNOSIS — Z3A.36 36 WEEKS GESTATION OF PREGNANCY: Primary | ICD-10-CM

## 2023-11-09 LAB
BASOPHILS # BLD AUTO: 0.03 K/UL (ref 0–0.2)
BASOPHILS NFR BLD: 0.6 % (ref 0–1.9)
DIFFERENTIAL METHOD: ABNORMAL
EOSINOPHIL # BLD AUTO: 0.1 K/UL (ref 0–0.5)
EOSINOPHIL NFR BLD: 1.2 % (ref 0–8)
ERYTHROCYTE [DISTWIDTH] IN BLOOD BY AUTOMATED COUNT: 13.2 % (ref 11.5–14.5)
HCT VFR BLD AUTO: 27 % (ref 37–48.5)
HGB BLD-MCNC: 8.3 G/DL (ref 12–16)
HIV 1+2 AB+HIV1 P24 AG SERPL QL IA: NORMAL
IMM GRANULOCYTES # BLD AUTO: 0.02 K/UL (ref 0–0.04)
IMM GRANULOCYTES NFR BLD AUTO: 0.4 % (ref 0–0.5)
LYMPHOCYTES # BLD AUTO: 1.1 K/UL (ref 1–4.8)
LYMPHOCYTES NFR BLD: 22.1 % (ref 18–48)
MCH RBC QN AUTO: 30.3 PG (ref 27–31)
MCHC RBC AUTO-ENTMCNC: 30.7 G/DL (ref 32–36)
MCV RBC AUTO: 99 FL (ref 82–98)
MONOCYTES # BLD AUTO: 0.6 K/UL (ref 0.3–1)
MONOCYTES NFR BLD: 12.5 % (ref 4–15)
NEUTROPHILS # BLD AUTO: 3.1 K/UL (ref 1.8–7.7)
NEUTROPHILS NFR BLD: 63.2 % (ref 38–73)
NRBC BLD-RTO: 0 /100 WBC
PLATELET # BLD AUTO: 247 K/UL (ref 150–450)
PMV BLD AUTO: 9.6 FL (ref 9.2–12.9)
RBC # BLD AUTO: 2.74 M/UL (ref 4–5.4)
WBC # BLD AUTO: 4.88 K/UL (ref 3.9–12.7)

## 2023-11-09 PROCEDURE — 87081 CULTURE SCREEN ONLY: CPT | Performed by: STUDENT IN AN ORGANIZED HEALTH CARE EDUCATION/TRAINING PROGRAM

## 2023-11-09 PROCEDURE — 85025 COMPLETE CBC W/AUTO DIFF WBC: CPT | Performed by: STUDENT IN AN ORGANIZED HEALTH CARE EDUCATION/TRAINING PROGRAM

## 2023-11-09 PROCEDURE — 36415 COLL VENOUS BLD VENIPUNCTURE: CPT | Mod: ,,, | Performed by: STUDENT IN AN ORGANIZED HEALTH CARE EDUCATION/TRAINING PROGRAM

## 2023-11-09 PROCEDURE — 0502F SUBSEQUENT PRENATAL CARE: CPT | Mod: CPTII,S$GLB,, | Performed by: STUDENT IN AN ORGANIZED HEALTH CARE EDUCATION/TRAINING PROGRAM

## 2023-11-09 PROCEDURE — 0502F PR SUBSEQUENT PRENATAL CARE: ICD-10-PCS | Mod: CPTII,S$GLB,, | Performed by: STUDENT IN AN ORGANIZED HEALTH CARE EDUCATION/TRAINING PROGRAM

## 2023-11-09 PROCEDURE — 36415 PR COLLECTION VENOUS BLOOD,VENIPUNCTURE: ICD-10-PCS | Mod: ,,, | Performed by: STUDENT IN AN ORGANIZED HEALTH CARE EDUCATION/TRAINING PROGRAM

## 2023-11-09 NOTE — PROGRESS NOTES
2023  Chief Complaint   Patient presents with    Routine Prenatal Visit     36 0/7     32 y.o.  at 36w0d  Estimated Date of Delivery: 2023, by Ultrasound, dating reviewed.      Patient reports: Good fetal movements reported. No Bleeding, leakage of fluid or contractions. She is doing well.  She is taking prenatal vitamins. Ms. Garza is adjusting well and has a good support system of family and friends. She is coping with pregnancy.    OB risk factors: late to care, hx of  x1, requesting TOLAC    OB hx: G1 was delivery via emergent  section due to fetal bradycardia immediately following epidural. She was already 7 cm dilated.      PMH: denies  PSH:  x1, gastric sleeve  Allergies: NKDA  Meds: PNVs    Prenatal labs reviewed and updated today  - Rh positive  - Rubella immune  - STD neg  - GCT wnl  - NIPT low risk  - H/H 9.2/28.4,     OB History    Para Term  AB Living   2 1 1     1   SAB IAB Ectopic Multiple Live Births           1      # Outcome Date GA Lbr Shalom/2nd Weight Sex Delivery Anes PTL Lv   2 Current            1 Term 06/15/17 38w0d  2.607 kg (5 lb 12 oz) F CS-Unspec EPI  BRIDGETTE      Complications: IUGR (intrauterine growth restriction) affecting care of mother       Review of Systems:  General ROS: negative for headache or visual changes  Breast ROS: negative for breast lumps  Gastrointestinal ROS: negative for constipation, diarrhea or nausea/vomiting  Musculoskeletal ROS: negative for pain in joints or swelling in face or hands.   Neurological ROS: negative for - headaches, numbness/tingling or visual changes      Physical Exam:  /63   Wt 84.4 kg (186 lb)   LMP 06/10/2023 (Exact Date)   BMI 35.14 kg/m²     Constitutional: She is oriented to person, place, and time. She appears well-developed and well-nourished. No distress.     Pulmonary/Chest: Effort normal. No respiratory distress  Abdominal: Soft, gravid, nontender. No rebound  and no guarding.   Genitourinary: GBS collected  Musculoskeletal: Normal range of motion, No peripheral edema.   Neurological: She is alert and oriented to person, place, and time. Coordination normal.   Skin: Skin is warm and dry. She is not diaphoretic.  Psychiatric: She has a normal mood and affect.      Assessment:  Overall doing well.   32 y.o.at 36w0d by 26w1d sono    There is no problem list on file for this patient.    Current Outpatient Medications on File Prior to Visit   Medication Sig Dispense Refill    docusate sodium (COLACE) 100 MG capsule Take 1 capsule (100 mg total) by mouth 2 (two) times daily as needed for Constipation. (Patient not taking: Reported on 10/4/2023) 60 capsule 1    ferrous sulfate 324 mg (65 mg iron) TbEC Take 1 tablet (324 mg total) by mouth once daily. 30 tablet 5    ferrous sulfate 325 (65 FE) MG EC tablet Take 1 tablet (325 mg total) by mouth once daily. (Patient not taking: Reported on 10/16/2023) 30 tablet 6    PNV,calcium 72-iron-folic acid (PRENATAL VITAMIN PLUS LOW IRON) 27 mg iron- 1 mg Tab Take 1 tablet (1 each total) by mouth once daily. 30 tablet 4     No current facility-administered medications on file prior to visit.     36 weeks gestation of pregnancy  -     Strep B Screen, Vaginal / Rectal    Desires  (vaginal birth after ) trial    Obesity affecting pregnancy in third trimester, unspecified obesity type        Plan:  Overall doing well  - 3T not done last visit as ordered. GBS today  - Continue to take Prenatal vitamins. Continue po iron supplementation  - Desires to defer Tdap till postpartum  - Pt strongly desires TOLAC. We have discussed the risk and benefits of TOLAC versus scheduled repeat  section in detail. She understands the risk of TOLAC including failure requiring  delivery, emergent  delivery and fetal compromise due to uterine rupture, hemorrhage, prolonged hospitalization and death due to complications. Her  predicted chance of successful  is 53.4 % by NICHD criteria. Her chances are better if she goes into labor on her own. She understands that if I am unable to be present when she goes into labor, it will be at the discretion of the admitting physician to determine if she could proceed with TOLAC, and they could possibly decline and recommend repeat  delivery. Patient has been well counseled, understands the risk, as such I will be okay proceeding with TOLAC. OP report confirms low transverse uterine incision.  - Follow up 1 week, bleeding/pain precautions, kick counts, labor and pre-eclampsia precautions discussed. .

## 2023-11-10 LAB — RPR SER QL: NORMAL

## 2023-11-13 LAB — BACTERIA SPEC AEROBE CULT: NORMAL

## 2023-11-15 ENCOUNTER — ROUTINE PRENATAL (OUTPATIENT)
Dept: OBSTETRICS AND GYNECOLOGY | Facility: CLINIC | Age: 33
End: 2023-11-15
Payer: COMMERCIAL

## 2023-11-15 VITALS
BODY MASS INDEX: 35.37 KG/M2 | WEIGHT: 187.19 LBS | HEART RATE: 88 BPM | SYSTOLIC BLOOD PRESSURE: 128 MMHG | DIASTOLIC BLOOD PRESSURE: 76 MMHG

## 2023-11-15 DIAGNOSIS — O34.219 DESIRES VBAC (VAGINAL BIRTH AFTER CESAREAN) TRIAL: ICD-10-CM

## 2023-11-15 DIAGNOSIS — O34.219 HISTORY OF CESAREAN DELIVERY, CURRENTLY PREGNANT: ICD-10-CM

## 2023-11-15 DIAGNOSIS — O26.843 UTERINE SIZE-DATE DISCREPANCY IN THIRD TRIMESTER: ICD-10-CM

## 2023-11-15 DIAGNOSIS — Z3A.36 36 WEEKS GESTATION OF PREGNANCY: Primary | ICD-10-CM

## 2023-11-15 PROCEDURE — 0502F PR SUBSEQUENT PRENATAL CARE: ICD-10-PCS | Mod: CPTII,S$GLB,, | Performed by: STUDENT IN AN ORGANIZED HEALTH CARE EDUCATION/TRAINING PROGRAM

## 2023-11-15 PROCEDURE — 0502F SUBSEQUENT PRENATAL CARE: CPT | Mod: CPTII,S$GLB,, | Performed by: STUDENT IN AN ORGANIZED HEALTH CARE EDUCATION/TRAINING PROGRAM

## 2023-11-15 NOTE — PROGRESS NOTES
11/15/2023  Chief Complaint   Patient presents with    Routine Prenatal Visit     Pt is here for a 36 wk OB visit     32 y.o.  at 36w6d  Estimated Date of Delivery: 2023, by Ultrasound, dating reviewed.      Patient reports: Good fetal movements reported. No Bleeding, leakage of fluid or contractions. She is doing well.  She is taking prenatal vitamins. Ms. Garza is adjusting well and has a good support system of family and friends. She is coping with pregnancy.    OB risk factors: late to care, hx of  x1, requesting TOLAC    OB hx: G1 was delivery via emergent  section due to fetal bradycardia immediately following epidural. She was already 7 cm dilated.      PMH: denies  PSH:  x1, gastric sleeve  Allergies: NKDA  Meds: PNVs    Prenatal labs reviewed and updated today  - Rh positive  - Rubella immune  - STD neg  - GCT wnl  - NIPT low risk  - H/H 8.3,   - GBS negative    OB History    Para Term  AB Living   2 1 1     1   SAB IAB Ectopic Multiple Live Births           1      # Outcome Date GA Lbr Shalom/2nd Weight Sex Delivery Anes PTL Lv   2 Current            1 Term 06/15/17 38w0d  2.607 kg (5 lb 12 oz) F CS-Unspec EPI  BRIDGETTE      Complications: IUGR (intrauterine growth restriction) affecting care of mother       Review of Systems:  General ROS: negative for headache or visual changes  Breast ROS: negative for breast lumps  Gastrointestinal ROS: negative for constipation, diarrhea or nausea/vomiting  Musculoskeletal ROS: negative for pain in joints or swelling in face or hands.   Neurological ROS: negative for - headaches, numbness/tingling or visual changes      Physical Exam:  /76   Pulse 88   Wt 84.9 kg (187 lb 3.2 oz)   LMP 06/10/2023 (Exact Date)   BMI 35.37 kg/m²     Constitutional: She is oriented to person, place, and time. She appears well-developed and well-nourished. No distress.     Pulmonary/Chest: Effort normal. No respiratory  distress  Abdominal: Soft, gravid, nontender. No rebound and no guarding.   Genitourinary: deferred  Musculoskeletal: Normal range of motion, No peripheral edema.   Neurological: She is alert and oriented to person, place, and time. Coordination normal.   Skin: Skin is warm and dry. She is not diaphoretic.  Psychiatric: She has a normal mood and affect.      Assessment:  Overall doing well.   32 y.o.at 36w6d by 26w1d sono    There is no problem list on file for this patient.    Current Outpatient Medications on File Prior to Visit   Medication Sig Dispense Refill    ferrous sulfate 324 mg (65 mg iron) TbEC Take 1 tablet (324 mg total) by mouth once daily. 30 tablet 5    PNV,calcium 72-iron-folic acid (PRENATAL VITAMIN PLUS LOW IRON) 27 mg iron- 1 mg Tab Take 1 tablet (1 each total) by mouth once daily. 30 tablet 4    docusate sodium (COLACE) 100 MG capsule Take 1 capsule (100 mg total) by mouth 2 (two) times daily as needed for Constipation. (Patient not taking: Reported on 10/4/2023) 60 capsule 1    ferrous sulfate 325 (65 FE) MG EC tablet Take 1 tablet (325 mg total) by mouth once daily. (Patient not taking: Reported on 10/16/2023) 30 tablet 6     No current facility-administered medications on file prior to visit.     36 weeks gestation of pregnancy    Desires  (vaginal birth after ) trial    History of  delivery, currently pregnant    Uterine size-date discrepancy in third trimester  -     US OB/GYN Procedure ([facility]); Future; Expected date: 2023      Plan:  Overall doing well  - Continue to take Prenatal vitamins. Continue po iron supplementation  - Desires to defer Tdap till postpartum  - Pt strongly desires . We have discussed the risk and benefits of TOLAC versus scheduled repeat  section in detail. She understands the risk of TOLAC including failure requiring  delivery, emergent  delivery and fetal compromise due to uterine rupture, hemorrhage,  prolonged hospitalization and death due to complications. Her predicted chance of successful  is 53.4 % by NICHD criteria. Her chances are better if she goes into labor on her own. She understands that if I am unable to be present when she goes into labor, it will be at the discretion of the admitting physician to determine if she could proceed with TOLAC, and they could possibly decline and recommend repeat  delivery. Patient has been well counseled, understands the risk, as such I will be okay proceeding with TOLAC. OP report confirms low transverse uterine incision.  - Follow up 1 week, bleeding/pain precautions, kick counts, labor and pre-eclampsia precautions discussed. .

## 2023-11-20 ENCOUNTER — PROCEDURE VISIT (OUTPATIENT)
Dept: MATERNAL FETAL MEDICINE | Facility: CLINIC | Age: 33
End: 2023-11-20
Payer: COMMERCIAL

## 2023-11-20 DIAGNOSIS — O26.843 UTERINE SIZE-DATE DISCREPANCY IN THIRD TRIMESTER: ICD-10-CM

## 2023-11-20 PROCEDURE — 76816 OB US FOLLOW-UP PER FETUS: CPT | Mod: S$GLB,,, | Performed by: OBSTETRICS & GYNECOLOGY

## 2023-11-20 PROCEDURE — 76816 US OB/GYN PROCEDURE (VIEWPOINT): ICD-10-PCS | Mod: S$GLB,,, | Performed by: OBSTETRICS & GYNECOLOGY

## 2023-11-21 ENCOUNTER — ROUTINE PRENATAL (OUTPATIENT)
Dept: OBSTETRICS AND GYNECOLOGY | Facility: CLINIC | Age: 33
End: 2023-11-21
Payer: COMMERCIAL

## 2023-11-21 ENCOUNTER — TELEPHONE (OUTPATIENT)
Dept: OBSTETRICS AND GYNECOLOGY | Facility: CLINIC | Age: 33
End: 2023-11-21

## 2023-11-21 VITALS — BODY MASS INDEX: 35.9 KG/M2 | WEIGHT: 190 LBS | DIASTOLIC BLOOD PRESSURE: 74 MMHG | SYSTOLIC BLOOD PRESSURE: 124 MMHG

## 2023-11-21 DIAGNOSIS — O99.013 ANEMIA DURING PREGNANCY IN THIRD TRIMESTER: ICD-10-CM

## 2023-11-21 DIAGNOSIS — Z3A.37 37 WEEKS GESTATION OF PREGNANCY: Primary | ICD-10-CM

## 2023-11-21 DIAGNOSIS — O34.219 DESIRES VBAC (VAGINAL BIRTH AFTER CESAREAN) TRIAL: ICD-10-CM

## 2023-11-21 PROCEDURE — 0502F PR SUBSEQUENT PRENATAL CARE: ICD-10-PCS | Mod: CPTII,S$GLB,, | Performed by: STUDENT IN AN ORGANIZED HEALTH CARE EDUCATION/TRAINING PROGRAM

## 2023-11-21 PROCEDURE — 0502F SUBSEQUENT PRENATAL CARE: CPT | Mod: CPTII,S$GLB,, | Performed by: STUDENT IN AN ORGANIZED HEALTH CARE EDUCATION/TRAINING PROGRAM

## 2023-11-21 NOTE — TELEPHONE ENCOUNTER
Patient would like order for briefs to be sent to insurance per today's appointment. Called to verify fax number. No answer, LVM to confirm.

## 2023-11-21 NOTE — PROGRESS NOTES
2023  Chief Complaint   Patient presents with    Routine Prenatal Visit     37      32 y.o.  at 37w5d  Estimated Date of Delivery: 2023, by Ultrasound, dating reviewed.      Patient reports: Good fetal movements reported. No Bleeding, leakage of fluid or contractions. She is doing well.  She is taking prenatal vitamins. Ms. Garza is adjusting well and has a good support system of family and friends. She is coping with pregnancy.    OB risk factors: late to care, hx of  x1, requesting TOLAC    OB hx: G1 was delivery via emergent  section due to fetal bradycardia immediately following epidural. She was already 7 cm dilated.      PMH: denies  PSH:  x1, gastric sleeve  Allergies: NKDA  Meds: PNVs    Prenatal labs reviewed and updated today  - Rh positive  - Rubella immune  - STD neg  - GCT wnl  - NIPT low risk  - H/H 8.3,   - GBS negative    OB History    Para Term  AB Living   2 1 1     1   SAB IAB Ectopic Multiple Live Births           1      # Outcome Date GA Lbr Shalom/2nd Weight Sex Delivery Anes PTL Lv   2 Current            1 Term 06/15/17 38w0d  2.607 kg (5 lb 12 oz) F CS-Unspec EPI  BRIDGETTE      Complications: IUGR (intrauterine growth restriction) affecting care of mother       Review of Systems:  General ROS: negative for headache or visual changes  Breast ROS: negative for breast lumps  Gastrointestinal ROS: negative for constipation, diarrhea or nausea/vomiting  Musculoskeletal ROS: negative for pain in joints or swelling in face or hands.   Neurological ROS: negative for - headaches, numbness/tingling or visual changes      Physical Exam:  /74   Wt 86.2 kg (190 lb)   LMP 06/10/2023 (Exact Date)   BMI 35.90 kg/m²     Constitutional: She is oriented to person, place, and time. She appears well-developed and well-nourished. No distress.     Pulmonary/Chest: Effort normal. No respiratory distress  Abdominal: Soft, gravid,  nontender. No rebound and no guarding.   Genitourinary: /-3  Musculoskeletal: Normal range of motion, No peripheral edema.   Neurological: She is alert and oriented to person, place, and time. Coordination normal.   Skin: Skin is warm and dry. She is not diaphoretic.  Psychiatric: She has a normal mood and affect.      Assessment:  Overall doing well.   32 y.o.at 37w5d by 26w1d sono    There is no problem list on file for this patient.    Current Outpatient Medications on File Prior to Visit   Medication Sig Dispense Refill    ferrous sulfate 324 mg (65 mg iron) TbEC Take 1 tablet (324 mg total) by mouth once daily. 30 tablet 5    PNV,calcium 72-iron-folic acid (PRENATAL VITAMIN PLUS LOW IRON) 27 mg iron- 1 mg Tab Take 1 tablet (1 each total) by mouth once daily. 30 tablet 4    docusate sodium (COLACE) 100 MG capsule Take 1 capsule (100 mg total) by mouth 2 (two) times daily as needed for Constipation. (Patient not taking: Reported on 10/4/2023) 60 capsule 1    ferrous sulfate 325 (65 FE) MG EC tablet Take 1 tablet (325 mg total) by mouth once daily. (Patient not taking: Reported on 10/16/2023) 30 tablet 6     No current facility-administered medications on file prior to visit.     37 weeks gestation of pregnancy    Desires  (vaginal birth after ) trial    Anemia during pregnancy in third trimester    Other orders  -     diaper,brief,adult,disposable Misc; 1 each by Misc.(Non-Drug; Combo Route) route every 4 (four) hours as needed (urinary incontinence).  Dispense: 20 each; Refill: 2      Plan:  Overall doing well  - EFW 3048g (36%ile), MVP 4.3 on 23  - Continue to take Prenatal vitamins. Continue po iron supplementation  - Desires to defer Tdap till postpartum  - Pt strongly desires . We have discussed the risk and benefits of TOLAC versus scheduled repeat  section in detail. She understands the risk of TOLAC including failure requiring  delivery, emergent  delivery  and fetal compromise due to uterine rupture, hemorrhage, prolonged hospitalization and death due to complications. Her predicted chance of successful  is 53.4 % by NICHD criteria. Her chances are better if she goes into labor on her own. She understands that if I am unable to be present when she goes into labor, it will be at the discretion of the admitting physician to determine if she could proceed with TOLAC, and they could possibly decline and recommend repeat  delivery. Patient has been well counseled, understands the risk, as such I will be okay proceeding with TOLAC. OP report confirms low transverse uterine incision.  - Follow up 1 week, bleeding/pain precautions, kick counts, labor and pre-eclampsia precautions discussed. .

## 2023-11-28 ENCOUNTER — ROUTINE PRENATAL (OUTPATIENT)
Dept: OBSTETRICS AND GYNECOLOGY | Facility: CLINIC | Age: 33
End: 2023-11-28
Payer: COMMERCIAL

## 2023-11-28 VITALS
BODY MASS INDEX: 34.54 KG/M2 | DIASTOLIC BLOOD PRESSURE: 66 MMHG | WEIGHT: 182.81 LBS | HEART RATE: 89 BPM | SYSTOLIC BLOOD PRESSURE: 122 MMHG

## 2023-11-28 DIAGNOSIS — O34.219 DESIRES VBAC (VAGINAL BIRTH AFTER CESAREAN) TRIAL: ICD-10-CM

## 2023-11-28 DIAGNOSIS — Z3A.38 38 WEEKS GESTATION OF PREGNANCY: Primary | ICD-10-CM

## 2023-11-28 DIAGNOSIS — O99.213 OBESITY AFFECTING PREGNANCY IN THIRD TRIMESTER, UNSPECIFIED OBESITY TYPE: ICD-10-CM

## 2023-11-28 DIAGNOSIS — O34.219 HISTORY OF CESAREAN DELIVERY, CURRENTLY PREGNANT: ICD-10-CM

## 2023-11-28 PROCEDURE — 0502F PR SUBSEQUENT PRENATAL CARE: ICD-10-PCS | Mod: CPTII,S$GLB,, | Performed by: STUDENT IN AN ORGANIZED HEALTH CARE EDUCATION/TRAINING PROGRAM

## 2023-11-28 PROCEDURE — 0502F SUBSEQUENT PRENATAL CARE: CPT | Mod: CPTII,S$GLB,, | Performed by: STUDENT IN AN ORGANIZED HEALTH CARE EDUCATION/TRAINING PROGRAM

## 2023-11-28 NOTE — PROGRESS NOTES
2023  Chief Complaint   Patient presents with    Routine Prenatal Visit     Pt is here for a 38 week OB visit     32 y.o.  at 38w5d  Estimated Date of Delivery: 2023, by Ultrasound, dating reviewed.      Patient reports: Good fetal movements reported. No Bleeding, leakage of fluid or contractions. She is doing well.  She is taking prenatal vitamins. Ms. Garza is adjusting well and has a good support system of family and friends. She is coping with pregnancy.    OB risk factors: late to care, hx of  x1, requesting TOLAC    OB hx: G1 was delivery via emergent  section due to fetal bradycardia immediately following epidural. She was already 7 cm dilated.      PMH: denies  PSH:  x1, gastric sleeve  Allergies: NKDA  Meds: PNVs    Prenatal labs reviewed and updated today  - Rh positive  - Rubella immune  - STD neg  - GCT wnl  - NIPT low risk  - H/H 8.3,   - GBS negative    OB History    Para Term  AB Living   2 1 1     1   SAB IAB Ectopic Multiple Live Births           1      # Outcome Date GA Lbr Shalom/2nd Weight Sex Delivery Anes PTL Lv   2 Current            1 Term 06/15/17 38w0d  2.607 kg (5 lb 12 oz) F CS-Unspec EPI  BRIDGETTE      Complications: IUGR (intrauterine growth restriction) affecting care of mother       Review of Systems:  General ROS: negative for headache or visual changes  Breast ROS: negative for breast lumps  Gastrointestinal ROS: negative for constipation, diarrhea or nausea/vomiting  Musculoskeletal ROS: negative for pain in joints or swelling in face or hands.   Neurological ROS: negative for - headaches, numbness/tingling or visual changes      Physical Exam:  /66   Pulse 89   Wt 82.9 kg (182 lb 12.8 oz)   LMP 06/10/2023 (Exact Date)   BMI 34.54 kg/m²     Constitutional: She is oriented to person, place, and time. She appears well-developed and well-nourished. No distress.     Pulmonary/Chest: Effort normal. No  respiratory distress  Abdominal: Soft, gravid, nontender. No rebound and no guarding.   Genitourinary: /-3  Musculoskeletal: Normal range of motion, No peripheral edema.   Neurological: She is alert and oriented to person, place, and time. Coordination normal.   Skin: Skin is warm and dry. She is not diaphoretic.  Psychiatric: She has a normal mood and affect.      Assessment:  Overall doing well.   32 y.o.at 37w5d by 26w1d sono    There is no problem list on file for this patient.    Current Outpatient Medications on File Prior to Visit   Medication Sig Dispense Refill    diaper,brief,adult,disposable Misc 1 each by Misc.(Non-Drug; Combo Route) route every 4 (four) hours as needed (urinary incontinence). 20 each 2    ferrous sulfate 324 mg (65 mg iron) TbEC Take 1 tablet (324 mg total) by mouth once daily. 30 tablet 5    PNV,calcium 72-iron-folic acid (PRENATAL VITAMIN PLUS LOW IRON) 27 mg iron- 1 mg Tab Take 1 tablet (1 each total) by mouth once daily. 30 tablet 4    docusate sodium (COLACE) 100 MG capsule Take 1 capsule (100 mg total) by mouth 2 (two) times daily as needed for Constipation. (Patient not taking: Reported on 10/4/2023) 60 capsule 1    ferrous sulfate 325 (65 FE) MG EC tablet Take 1 tablet (325 mg total) by mouth once daily. (Patient not taking: Reported on 10/16/2023) 30 tablet 6     No current facility-administered medications on file prior to visit.     38 weeks gestation of pregnancy    Obesity affecting pregnancy in third trimester, unspecified obesity type    Desires  (vaginal birth after ) trial    History of  delivery, currently pregnant        Plan:  Overall doing well  - EFW 3048g (36%ile), MVP 4.3 on 23  - Continue to take Prenatal vitamins. Continue po iron supplementation  - Desires to defer Tdap till postpartum  - Pt strongly desires . We have discussed the risk and benefits of TOLAC versus scheduled repeat  section in detail. She understands  the risk of TOLAC including failure requiring  delivery, emergent  delivery and fetal compromise due to uterine rupture, hemorrhage, prolonged hospitalization and death due to complications. Her predicted chance of successful  is 53.4 % by NICHD criteria. Her chances are better if she goes into labor on her own. She understands that if I am unable to be present when she goes into labor, it will be at the discretion of the admitting physician to determine if she could proceed with TOLAC, and they could possibly decline and recommend repeat  delivery. Patient has been well counseled, understands the risk, as such I will be okay proceeding with TOLAC. OP report confirms low transverse uterine incision.  - Follow up 1 week, bleeding/pain precautions, kick counts, labor and pre-eclampsia precautions discussed. .

## 2023-12-04 ENCOUNTER — ROUTINE PRENATAL (OUTPATIENT)
Dept: OBSTETRICS AND GYNECOLOGY | Facility: CLINIC | Age: 33
End: 2023-12-04
Payer: COMMERCIAL

## 2023-12-04 VITALS — SYSTOLIC BLOOD PRESSURE: 126 MMHG | DIASTOLIC BLOOD PRESSURE: 82 MMHG | WEIGHT: 184 LBS | BODY MASS INDEX: 34.77 KG/M2

## 2023-12-04 DIAGNOSIS — Z3A.39 39 WEEKS GESTATION OF PREGNANCY: Primary | ICD-10-CM

## 2023-12-04 DIAGNOSIS — O99.213 OBESITY AFFECTING PREGNANCY IN THIRD TRIMESTER, UNSPECIFIED OBESITY TYPE: ICD-10-CM

## 2023-12-04 DIAGNOSIS — O99.013 ANEMIA DURING PREGNANCY IN THIRD TRIMESTER: ICD-10-CM

## 2023-12-04 DIAGNOSIS — O34.219 DESIRES VBAC (VAGINAL BIRTH AFTER CESAREAN) TRIAL: ICD-10-CM

## 2023-12-04 DIAGNOSIS — O34.219 HISTORY OF CESAREAN DELIVERY, CURRENTLY PREGNANT: ICD-10-CM

## 2023-12-04 PROCEDURE — 0502F PR SUBSEQUENT PRENATAL CARE: ICD-10-PCS | Mod: CPTII,S$GLB,, | Performed by: STUDENT IN AN ORGANIZED HEALTH CARE EDUCATION/TRAINING PROGRAM

## 2023-12-04 PROCEDURE — 0502F SUBSEQUENT PRENATAL CARE: CPT | Mod: CPTII,S$GLB,, | Performed by: STUDENT IN AN ORGANIZED HEALTH CARE EDUCATION/TRAINING PROGRAM

## 2023-12-04 NOTE — PROGRESS NOTES
2023  Chief Complaint   Patient presents with    Routine Prenatal Visit     39      32 y.o.  at 39w4d  Estimated Date of Delivery: 2023, by Ultrasound, dating reviewed.      Patient reports: Good fetal movements reported. No Bleeding, leakage of fluid or contractions. She is doing well.  She is taking prenatal vitamins. Ms. Garza is adjusting well and has a good support system of family and friends. She is coping with pregnancy.    OB risk factors: late to care, hx of  x1, requesting TOLAC    OB hx: G1 was delivery via emergent  section due to fetal bradycardia immediately following epidural. She was already 7 cm dilated.      PMH: denies  PSH:  x1, gastric sleeve  Allergies: NKDA  Meds: PNVs    Prenatal labs reviewed and updated today  - Rh positive  - Rubella immune  - STD neg  - GCT wnl  - NIPT low risk  - H/H 8.3,   - GBS negative    OB History    Para Term  AB Living   2 1 1     1   SAB IAB Ectopic Multiple Live Births           1      # Outcome Date GA Lbr Shalom/2nd Weight Sex Delivery Anes PTL Lv   2 Current            1 Term 06/15/17 38w0d  2.607 kg (5 lb 12 oz) F CS-Unspec EPI  BRIDGETTE      Complications: IUGR (intrauterine growth restriction) affecting care of mother       Review of Systems:  General ROS: negative for headache or visual changes  Breast ROS: negative for breast lumps  Gastrointestinal ROS: negative for constipation, diarrhea or nausea/vomiting  Musculoskeletal ROS: negative for pain in joints or swelling in face or hands.   Neurological ROS: negative for - headaches, numbness/tingling or visual changes      Physical Exam:  /82   Wt 83.5 kg (184 lb)   LMP 06/10/2023 (Exact Date)   BMI 34.77 kg/m²     Constitutional: She is oriented to person, place, and time. She appears well-developed and well-nourished. No distress.     Pulmonary/Chest: Effort normal. No respiratory distress  Abdominal: Soft, gravid,  nontender. No rebound and no guarding.   Genitourinary: 50/-3  Musculoskeletal: Normal range of motion, No peripheral edema.   Neurological: She is alert and oriented to person, place, and time. Coordination normal.   Skin: Skin is warm and dry. She is not diaphoretic.  Psychiatric: She has a normal mood and affect.      Assessment:  Overall doing well.   32 y.o.at 39w4d by 26w1d sono    There is no problem list on file for this patient.    Current Outpatient Medications on File Prior to Visit   Medication Sig Dispense Refill    docusate sodium (COLACE) 100 MG capsule Take 1 capsule (100 mg total) by mouth 2 (two) times daily as needed for Constipation. 60 capsule 1    ferrous sulfate 324 mg (65 mg iron) TbEC Take 1 tablet (324 mg total) by mouth once daily. 30 tablet 5    PNV,calcium 72-iron-folic acid (PRENATAL VITAMIN PLUS LOW IRON) 27 mg iron- 1 mg Tab Take 1 tablet (1 each total) by mouth once daily. 30 tablet 4    diaper,brief,adult,disposable Misc 1 each by Misc.(Non-Drug; Combo Route) route every 4 (four) hours as needed (urinary incontinence). (Patient not taking: Reported on 2023) 20 each 2    ferrous sulfate 325 (65 FE) MG EC tablet Take 1 tablet (325 mg total) by mouth once daily. (Patient not taking: Reported on 10/16/2023) 30 tablet 6     No current facility-administered medications on file prior to visit.     39 weeks gestation of pregnancy    History of  delivery, currently pregnant  -     Fetal non-stress test- Future; Future; Expected date: 2023    Desires  (vaginal birth after ) trial    Anemia during pregnancy in third trimester    Obesity affecting pregnancy in third trimester, unspecified obesity type      Plan:  Overall doing well  - EFW 3048g (36%ile), MVP 4.3 on 23  - Continue to take Prenatal vitamins. Continue po iron supplementation  - Desires to defer Tdap till postpartum  - Pt strongly desires . We have discussed the risk and benefits of TOLAC  versus scheduled repeat  section in detail. She understands the risk of TOLAC including failure requiring  delivery, emergent  delivery and fetal compromise due to uterine rupture, hemorrhage, prolonged hospitalization and death due to complications. Her predicted chance of successful  is 53.4 % by NICHD criteria. Her chances are better if she goes into labor on her own. She understands that if I am unable to be present when she goes into labor, it will be at the discretion of the admitting physician to determine if she could proceed with TOLAC, and they could possibly decline and recommend repeat  delivery. Patient has been well counseled, understands the risk, as such I will be okay proceeding with TOLAC. OP report confirms low transverse uterine incision.  - NST/BPP on 23, she will go to L+D  - Follow up 1 week, bleeding/pain precautions, kick counts, labor and pre-eclampsia precautions discussed. .

## 2023-12-07 ENCOUNTER — TELEPHONE (OUTPATIENT)
Dept: OBSTETRICS AND GYNECOLOGY | Facility: CLINIC | Age: 33
End: 2023-12-07
Payer: COMMERCIAL

## 2023-12-07 ENCOUNTER — PATIENT MESSAGE (OUTPATIENT)
Dept: OBSTETRICS AND GYNECOLOGY | Facility: CLINIC | Age: 33
End: 2023-12-07
Payer: COMMERCIAL

## 2023-12-07 ENCOUNTER — PROCEDURE VISIT (OUTPATIENT)
Dept: MATERNAL FETAL MEDICINE | Facility: CLINIC | Age: 33
End: 2023-12-07
Payer: COMMERCIAL

## 2023-12-07 DIAGNOSIS — O48.0 POST-DATES PREGNANCY: ICD-10-CM

## 2023-12-07 DIAGNOSIS — O48.0 POST-DATES PREGNANCY: Primary | ICD-10-CM

## 2023-12-07 PROCEDURE — 76819 US OB/GYN PROCEDURE (VIEWPOINT): ICD-10-PCS | Mod: S$GLB,,, | Performed by: OBSTETRICS & GYNECOLOGY

## 2023-12-07 PROCEDURE — 76819 FETAL BIOPHYS PROFIL W/O NST: CPT | Mod: S$GLB,,, | Performed by: OBSTETRICS & GYNECOLOGY

## 2023-12-07 NOTE — TELEPHONE ENCOUNTER
See mychart msg  ----- Message from Minoo Brush sent at 12/7/2023 11:04 AM CST -----  Contact: PT  Type:  Needs Medical Advice    Who Called: PT   Symptoms (please be specific): PT WAS ASKED TO CALL [facility] AFTER HER US APPT    Would the patient rather a call back or a response via [facility]? CALL   Best Call Back Number: [phone] (home)   Additional Information: PT LIVES 45MINS AWAY AND ASK FOR A CALL BACK ASAP IN THE EVENT THAT SHE NEEDS TO COME IN TO THE OFFICE.   THANK YOU

## 2023-12-07 NOTE — TELEPHONE ENCOUNTER
Per Dr. Oleary, pt notified to report to L&D at 4:30am 12/6/2023 for IOL. Pt advised to take miralax if no BM in 2 days. Pt reports BM every morning and VU of instructions.

## 2023-12-08 ENCOUNTER — OUTSIDE PLACE OF SERVICE (OUTPATIENT)
Dept: OBSTETRICS AND GYNECOLOGY | Facility: CLINIC | Age: 33
End: 2023-12-08
Payer: COMMERCIAL

## 2023-12-08 PROCEDURE — 59510 PR FULL ROUT OBSTE CARE,CESAREAN DELIV: ICD-10-PCS | Mod: ,,, | Performed by: STUDENT IN AN ORGANIZED HEALTH CARE EDUCATION/TRAINING PROGRAM

## 2023-12-08 PROCEDURE — 59510 CESAREAN DELIVERY: CPT | Mod: ,,, | Performed by: STUDENT IN AN ORGANIZED HEALTH CARE EDUCATION/TRAINING PROGRAM

## 2023-12-11 ENCOUNTER — TELEPHONE (OUTPATIENT)
Dept: OBSTETRICS AND GYNECOLOGY | Facility: CLINIC | Age: 33
End: 2023-12-11
Payer: COMMERCIAL

## 2023-12-11 NOTE — TELEPHONE ENCOUNTER
Pt has been scheduled with jeannie mcmillan for her post-op  ----- Message from Martín Damon Patient Care Assistant sent at 12/11/2023 11:27 AM CST -----  Contact: Pt  Type:  Sooner Appointment Request    Caller is requesting a sooner appointment.  Caller declined first available appointment listed below.  Caller will not accept being placed on the waitlist and is requesting a message be sent to doctor.    Name of Caller:  Pt  When is the first available appointment?  No avail appts  Symptoms:  [place] Appt  Best Call Back Number:  [phone] (home)   Additional Information:  Pt request to speak with Ralph to schedule. Please advise

## 2023-12-22 ENCOUNTER — OFFICE VISIT (OUTPATIENT)
Dept: OBSTETRICS AND GYNECOLOGY | Facility: CLINIC | Age: 33
End: 2023-12-22
Payer: COMMERCIAL

## 2023-12-22 VITALS
BODY MASS INDEX: 30.17 KG/M2 | WEIGHT: 159.81 LBS | DIASTOLIC BLOOD PRESSURE: 86 MMHG | SYSTOLIC BLOOD PRESSURE: 139 MMHG | HEART RATE: 85 BPM | HEIGHT: 61 IN

## 2023-12-22 DIAGNOSIS — Z98.890 POST-OPERATIVE STATE: Primary | ICD-10-CM

## 2023-12-22 PROCEDURE — 3075F SYST BP GE 130 - 139MM HG: CPT | Mod: CPTII,S$GLB,,

## 2023-12-22 PROCEDURE — 0503F POSTPARTUM CARE VISIT: CPT | Mod: S$GLB,,,

## 2023-12-22 PROCEDURE — 3008F PR BODY MASS INDEX (BMI) DOCUMENTED: ICD-10-PCS | Mod: CPTII,S$GLB,,

## 2023-12-22 PROCEDURE — 3075F PR MOST RECENT SYSTOLIC BLOOD PRESS GE 130-139MM HG: ICD-10-PCS | Mod: CPTII,S$GLB,,

## 2023-12-22 PROCEDURE — 1159F MED LIST DOCD IN RCRD: CPT | Mod: CPTII,S$GLB,,

## 2023-12-22 PROCEDURE — 3079F DIAST BP 80-89 MM HG: CPT | Mod: CPTII,S$GLB,,

## 2023-12-22 PROCEDURE — 0503F PR POSTPARTUM CARE VISIT: ICD-10-PCS | Mod: S$GLB,,,

## 2023-12-22 PROCEDURE — 3079F PR MOST RECENT DIASTOLIC BLOOD PRESSURE 80-89 MM HG: ICD-10-PCS | Mod: CPTII,S$GLB,,

## 2023-12-22 PROCEDURE — 3008F BODY MASS INDEX DOCD: CPT | Mod: CPTII,S$GLB,,

## 2023-12-22 PROCEDURE — 1159F PR MEDICATION LIST DOCUMENTED IN MEDICAL RECORD: ICD-10-PCS | Mod: CPTII,S$GLB,,

## 2023-12-22 NOTE — PROGRESS NOTES
"CC: Post-partum follow-up    Rachel Garza is a 33 y.o. female  who presents for post-partum visit.  She is 2 weeks S/P repeat C/S.  She and the baby are doing well.  No pain.  No fever.   No bowel / bladder complaints. Incision c/d/i.    Delivery Date: 2023  Delivery MD: Dr. Oleary  Gender: male  Breast Feeding: YES  Depression: NO  Contraception: no method    Pregnancy was complicated by:  No Complications    /86   Pulse 85   Ht 5' 1" (1.549 m)   Wt 72.5 kg (159 lb 12.8 oz)   LMP 06/10/2023 (Exact Date)   Breastfeeding Unknown   BMI 30.19 kg/m²     ROS:  GENERAL: No fever, chills, fatigability.  VULVAR: No pain, no lesions and no itching.  VAGINAL: No relaxation, no itching, no discharge, no abnormal bleeding and no lesions.  ABDOMEN: No abdominal pain. Denies nausea. Denies vomiting. No diarrhea. No constipation. Incision c/d/I.   BREAST: Denies pain. No lumps.  URINARY: No incontinence, no nocturia, no frequency and no dysuria.  CARDIOVASCULAR: No chest pain. No shortness of breath. No leg cramps.  NEUROLOGICAL: No headaches. No vision changes.    PHYSICAL EXAM:  General: alert, oriented x 3, normal judgment and pleasant demeanor  ABDOMEN:  Soft, non-tender, non-distended, incision c/d/i      IMP:  Doing well S/P C/S  Instructions / precautions reviewed    PLAN:  Continue pelvic rest  Return: 4 weeks for postpartum visit    "